# Patient Record
Sex: FEMALE | URBAN - METROPOLITAN AREA
[De-identification: names, ages, dates, MRNs, and addresses within clinical notes are randomized per-mention and may not be internally consistent; named-entity substitution may affect disease eponyms.]

---

## 2022-02-04 ENCOUNTER — OUTPATIENT (OUTPATIENT)
Dept: OUTPATIENT SERVICES | Facility: HOSPITAL | Age: 52
LOS: 1 days | End: 2022-02-04

## 2022-02-04 ENCOUNTER — TRANSCRIPTION ENCOUNTER (OUTPATIENT)
Age: 52
End: 2022-02-04

## 2022-02-04 ENCOUNTER — APPOINTMENT (OUTPATIENT)
Dept: ULTRASOUND IMAGING | Facility: CLINIC | Age: 52
End: 2022-02-04
Payer: COMMERCIAL

## 2022-02-04 PROCEDURE — 76700 US EXAM ABDOM COMPLETE: CPT | Mod: 26

## 2022-05-17 ENCOUNTER — NON-APPOINTMENT (OUTPATIENT)
Age: 52
End: 2022-05-17

## 2022-05-17 PROBLEM — Z00.00 ENCOUNTER FOR PREVENTIVE HEALTH EXAMINATION: Status: ACTIVE | Noted: 2022-05-17

## 2022-05-19 ENCOUNTER — NON-APPOINTMENT (OUTPATIENT)
Age: 52
End: 2022-05-19

## 2022-05-19 ENCOUNTER — APPOINTMENT (OUTPATIENT)
Dept: RHEUMATOLOGY | Facility: CLINIC | Age: 52
End: 2022-05-19
Payer: COMMERCIAL

## 2022-05-19 ENCOUNTER — LABORATORY RESULT (OUTPATIENT)
Age: 52
End: 2022-05-19

## 2022-05-19 VITALS
DIASTOLIC BLOOD PRESSURE: 85 MMHG | WEIGHT: 133.5 LBS | TEMPERATURE: 98.8 F | SYSTOLIC BLOOD PRESSURE: 128 MMHG | OXYGEN SATURATION: 97 % | HEART RATE: 79 BPM | HEIGHT: 62 IN | BODY MASS INDEX: 24.56 KG/M2

## 2022-05-19 DIAGNOSIS — Z86.018 PERSONAL HISTORY OF OTHER BENIGN NEOPLASM: ICD-10-CM

## 2022-05-19 DIAGNOSIS — Z86.39 PERSONAL HISTORY OF OTHER ENDOCRINE, NUTRITIONAL AND METABOLIC DISEASE: ICD-10-CM

## 2022-05-19 DIAGNOSIS — M12.30 PALINDROMIC RHEUMATISM, UNSPECIFIED SITE: ICD-10-CM

## 2022-05-19 DIAGNOSIS — M25.50 PAIN IN UNSPECIFIED JOINT: ICD-10-CM

## 2022-05-19 DIAGNOSIS — Z78.9 OTHER SPECIFIED HEALTH STATUS: ICD-10-CM

## 2022-05-19 PROCEDURE — 36415 COLL VENOUS BLD VENIPUNCTURE: CPT

## 2022-05-19 PROCEDURE — 99204 OFFICE O/P NEW MOD 45 MIN: CPT | Mod: 25

## 2022-05-19 RX ORDER — LEVOTHYROXINE SODIUM 112 UG/1
112 TABLET ORAL
Refills: 0 | Status: ACTIVE | COMMUNITY

## 2022-05-19 NOTE — HISTORY OF PRESENT ILLNESS
[FreeTextEntry1] : This is a 51-year-old woman she presents for an initial rheumatology consultation she reports that starting about 3 weeks ago she began having episodic severe pain possible swelling of various joints initial episode started 3 weeks ago she is physically active she runs she does [employer] but following a run for about an hour she developed severe pain in the big toe possibly a little swelling it lasted about 3 days and resolved completely she did not take anything then about 2 weeks ago she was using [employer] and a few days later again no injury she developed severe pain on the outside of her left knee she had difficulty walking she actually used her 's crutches for a few days and eventually resolved subsequent to that she had an episode of severe left shoulder pain she could not raise her arm up this again lasted a few days and improved she however does have some residual discomfort on full range of motion finally she had a brief episode of left hip pain and more recently a brief episode of right hand pain difficulty in making a fist she did take an occasional ibuprofen which does seem to help she had no prior episodes like this no precipitating factors no recent infections no GI issues no GI upset she has had no associated rashes no associated tick bites or tick removal she does have a history of hypothyroidism attributed to Hashimoto's she is clinically euthyroid on 120 12 mcg of thyroid she has had no fevers no weight loss no coughing no shortness of breath she apparently has had mild increase in transaminases with other liver studies normal there also does appear to be a slight increase in CPK she does report a few years ago she again had liver abnormalities and she was felt to possibly have hepatitis but this resolved I am wondering if this is not all due to her exercise and the ALT and the AST being from skeletal muscle based on her exercise routine she has no family history of any systemic rheumatologic issues

## 2022-05-19 NOTE — ASSESSMENT
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 or possibly other autoimmune process she does have Hashimoto's and another autoimmune condition such as rheumatoid arthritis could be present palindromic presentations of rheumatoid arthritis often have very good prognoses I am updating routine bloods as well as inflammatory markers I am checking rheumatoid factor CCP and GORGE I am also checking Lyme serologies although it is unlikely that this represents a form of Lyme arthritis she will be traveling to [facility] for a few weeks I did suggest that it is acceptable for her to take an occasional ibuprofen should she need it I also suggested range of motion for that residual discomfort in her left shoulder as shoulders do like to freeze up and its best to try to avoid this she was concerned about the mild abnormal transaminases that are seen I suspect these may represent skeletal muscle based on her exercise routines and her slight increase in CPK I will also be repeating all of these I did discuss with her that certainly if her serologies are positive and these episodes continue there may be strong consideration of starting hydroxychloroquine 2 prevent future episodes I am doing a G6PD screen although as a woman it is unlikely that she will be deficient will await these results I did suggest that we follow-up on all her blood results when they return I will be sending copy of this consult to her primary care physician will have access to bloods on the [facility] bloods will be drawn in the office today sent to core lab for processing

## 2022-05-19 NOTE — PHYSICAL EXAM
[General Appearance - Alert] : alert [General Appearance - In No Acute Distress] : in no acute distress [General Appearance - Well Nourished] : well nourished [General Appearance - Well Developed] : well developed [Edema] : there was no peripheral edema [Abnormal Walk] : normal gait [Nail Clubbing] : no clubbing  or cyanosis of the fingernails [Musculoskeletal - Swelling] : no joint swelling seen [Motor Tone] : muscle strength and tone were normal [Skin Color & Pigmentation] : normal skin color and pigmentation [Skin Turgor] : normal skin turgor [] : no rash [No Focal Deficits] : no focal deficits [FreeTextEntry1] : No synovitis no nodules mild impingement of left shoulder

## 2022-05-19 NOTE — DATA REVIEWED
[FreeTextEntry1] : Labs from January February and March in [facility] HI reviewed she did have a slightly low white count back in January of in the 3000 range ALT and AST was slightly elevated although they subsequently normalized there was a single CPK which was again slightly above normal range back in 2020 I also see a slight increase in AST

## 2022-05-19 NOTE — REVIEW OF SYSTEMS
[As Noted in HPI] : as noted in HPI [Feeling Poorly] : not feeling poorly [Feeling Tired] : not feeling tired [Dry Eyes] : no dryness of the eyes [Skin Lesions] : no skin lesions [Itching] : no itching [FreeTextEntry4] : No dry mouth

## 2022-05-20 ENCOUNTER — NON-APPOINTMENT (OUTPATIENT)
Age: 52
End: 2022-05-20

## 2022-05-20 LAB
ALBUMIN SERPL ELPH-MCNC: 4.6 G/DL
ALP BLD-CCNC: 117 U/L
ALT SERPL-CCNC: 63 U/L
ANION GAP SERPL CALC-SCNC: 12 MMOL/L
AST SERPL-CCNC: 41 U/L
BASOPHILS # BLD AUTO: 0.05 K/UL
BASOPHILS NFR BLD AUTO: 1.6 %
BILIRUB SERPL-MCNC: 0.4 MG/DL
BUN SERPL-MCNC: 11 MG/DL
CALCIUM SERPL-MCNC: 9.7 MG/DL
CHLORIDE SERPL-SCNC: 104 MMOL/L
CK SERPL-CCNC: 67 U/L
CO2 SERPL-SCNC: 25 MMOL/L
CREAT SERPL-MCNC: 0.76 MG/DL
CRP SERPL-MCNC: 4 MG/L
EGFR: 96 ML/MIN/1.73M2
EOSINOPHIL # BLD AUTO: 0.05 K/UL
EOSINOPHIL NFR BLD AUTO: 1.6 %
ERYTHROCYTE [SEDIMENTATION RATE] IN BLOOD BY WESTERGREN METHOD: 29 MM/HR
GLUCOSE SERPL-MCNC: 98 MG/DL
HCT VFR BLD CALC: 42.9 %
HGB BLD-MCNC: 13.9 G/DL
LYMPHOCYTES # BLD AUTO: 1.46 K/UL
LYMPHOCYTES NFR BLD AUTO: 50.8 %
MAN DIFF?: NORMAL
MCHC RBC-ENTMCNC: 31 PG
MCHC RBC-ENTMCNC: 32.4 GM/DL
MCV RBC AUTO: 95.8 FL
MONOCYTES # BLD AUTO: 0.32 K/UL
MONOCYTES NFR BLD AUTO: 11.3 %
NEUTROPHILS # BLD AUTO: 0.95 K/UL
NEUTROPHILS NFR BLD AUTO: 33.1 %
PLATELET # BLD AUTO: 210 K/UL
POTASSIUM SERPL-SCNC: 5.4 MMOL/L
PROT SERPL-MCNC: 7.2 G/DL
RBC # BLD: 4.48 M/UL
RBC # FLD: 12.5 %
RHEUMATOID FACT SER QL: 73 IU/ML
SODIUM SERPL-SCNC: 141 MMOL/L
WBC # FLD AUTO: 2.87 K/UL

## 2022-05-22 ENCOUNTER — NON-APPOINTMENT (OUTPATIENT)
Age: 52
End: 2022-05-22

## 2022-05-22 LAB
CCP AB SER IA-ACNC: >250 UNITS
DSDNA AB SER-ACNC: <12 IU/ML
ENA SS-A AB SER IA-ACNC: <0.2 AL
ENA SS-B AB SER IA-ACNC: <0.2 AL
RF+CCP IGG SER-IMP: ABNORMAL

## 2022-05-23 LAB — ANA SER IF-ACNC: NEGATIVE

## 2022-05-24 ENCOUNTER — NON-APPOINTMENT (OUTPATIENT)
Age: 52
End: 2022-05-24

## 2022-05-24 LAB — G6PD SER-CCNC: 16.8 U/G HGB

## 2022-06-06 ENCOUNTER — RX RENEWAL (OUTPATIENT)
Age: 52
End: 2022-06-06

## 2022-06-21 ENCOUNTER — NON-APPOINTMENT (OUTPATIENT)
Age: 52
End: 2022-06-21

## 2022-07-11 ENCOUNTER — APPOINTMENT (OUTPATIENT)
Dept: RHEUMATOLOGY | Facility: CLINIC | Age: 52
End: 2022-07-11

## 2022-07-11 ENCOUNTER — NON-APPOINTMENT (OUTPATIENT)
Age: 52
End: 2022-07-11

## 2022-07-11 VITALS
HEIGHT: 62 IN | OXYGEN SATURATION: 99 % | TEMPERATURE: 98.4 F | SYSTOLIC BLOOD PRESSURE: 109 MMHG | HEART RATE: 79 BPM | BODY MASS INDEX: 24.29 KG/M2 | DIASTOLIC BLOOD PRESSURE: 69 MMHG | WEIGHT: 132 LBS

## 2022-07-11 PROCEDURE — 36415 COLL VENOUS BLD VENIPUNCTURE: CPT

## 2022-07-11 PROCEDURE — 99214 OFFICE O/P EST MOD 30 MIN: CPT | Mod: 25

## 2022-07-11 RX ORDER — PREDNISONE 5 MG/1
5 TABLET ORAL
Qty: 60 | Refills: 6 | Status: ACTIVE | COMMUNITY
Start: 2022-07-11 | End: 1900-01-01

## 2022-07-11 NOTE — PHYSICAL EXAM
[General Appearance - Alert] : alert [General Appearance - In No Acute Distress] : in no acute distress [General Appearance - Well Nourished] : well nourished [General Appearance - Well Developed] : well developed [Sclera] : the sclera and conjunctiva were normal [] : no rash [FreeTextEntry1] : There is tenderness in the wrist consistent with a Decore veins there is also tenderness of both ankles

## 2022-07-11 NOTE — ASSESSMENT
[FreeTextEntry1] : This is a 52-year-old woman initially presenting as palindromic rheumatism but subsequently more persistent daily pain she has been on hydroxychloroquine 400 mg a day for about 4 weeks she has had some good days but continues to have days of very severe pain significant stiffness in the morning she did have abnormal ALT and AST with normal CPK however these appear to have been normalized she was seen by hepatology she had an MRI of her liver which was normal hepatitis serologies were negative I did discuss with her 8 trial of low-dose prednisone as bridge therapy I have recommended 10 mg a day with the hope that this will significantly rapidly improve her symptoms I would not be stopping the hydroxychloroquine I did explain to her I would want her to likely add a steroid sparing agent such as methotrexate however I am concerned about its liver toxicity and would await recommendations from hepatology regarding the safety of using it she also has a low white count which may also be a relative contraindication or caution with methotrexate I also discussed a biologic there does not appear to be any contraindication but I am checking QuantiFERON-TB prior to consideration of its use I plan from hearing back from her in a few days hopefully there will be a dramatic response to low-dose prednisone while we are awaiting a decision regarding the addition of methotrexate versus applying for insurance approval for a biologic such as Humira or Enbrel-bloods will be drawn in the office today and sent to core lab for review

## 2022-07-11 NOTE — DATA REVIEWED
[FreeTextEntry1] : Laboratory studies done at [facility] lab June 28 ALT and AST are now normal only gamma GTP is slightly high white count again remains low this is of unclear significance but seems to have preceded even starting of hydroxychloroquine and will need to be monitored MRI of the liver was normal hepatitis serologies were all negative in particular hepatitis B and hepatitis C will await review by hepatologist

## 2022-07-11 NOTE — HISTORY OF PRESENT ILLNESS
[FreeTextEntry1] : on  mg / day and ibuprofen - for pain's is a 4-year-old woman she presents for follow-up visit she has been on hydroxychloroquine 400 mg every day for about 30 days she reports that some days she feels better but other days she continues to have severe pain  in her wrists as well as her ankles with some swelling in her ankles significant stiffness in the morning she does ice the joints she does take ibuprofen which she tolerates well but continues to have days of severe pain she did recently see a hepatologist because of abnormal ALT and AST I had an opportunity to review his notes I have also had an opportunity to review his studies including a recent MRI and laboratory studies these were all done at [facility] and available to me of note my original GORGE and DNA have been negative although on repeat the GORGE is now positive but the double-stranded DNA the SSA the SSB are all negative ESR done in May was somewhat-she will be following up with hepatology next week

## 2022-07-12 LAB
ALBUMIN SERPL ELPH-MCNC: 4.5 G/DL
ALP BLD-CCNC: 86 U/L
ALT SERPL-CCNC: 25 U/L
ANION GAP SERPL CALC-SCNC: 13 MMOL/L
AST SERPL-CCNC: 26 U/L
BASOPHILS # BLD AUTO: 0.04 K/UL
BASOPHILS NFR BLD AUTO: 1.3 %
BILIRUB SERPL-MCNC: 0.3 MG/DL
BUN SERPL-MCNC: 16 MG/DL
CALCIUM SERPL-MCNC: 9 MG/DL
CHLORIDE SERPL-SCNC: 104 MMOL/L
CO2 SERPL-SCNC: 24 MMOL/L
CREAT SERPL-MCNC: 0.9 MG/DL
CRP SERPL-MCNC: 6 MG/L
EGFR: 78 ML/MIN/1.73M2
EOSINOPHIL # BLD AUTO: 0.06 K/UL
EOSINOPHIL NFR BLD AUTO: 1.9 %
ERYTHROCYTE [SEDIMENTATION RATE] IN BLOOD BY WESTERGREN METHOD: 40 MM/HR
GLUCOSE SERPL-MCNC: 100 MG/DL
HCT VFR BLD CALC: 38.1 %
HGB BLD-MCNC: 12.6 G/DL
IMM GRANULOCYTES NFR BLD AUTO: 0 %
LYMPHOCYTES # BLD AUTO: 1.05 K/UL
LYMPHOCYTES NFR BLD AUTO: 33.2 %
MAN DIFF?: NORMAL
MCHC RBC-ENTMCNC: 30.6 PG
MCHC RBC-ENTMCNC: 33.1 GM/DL
MCV RBC AUTO: 92.5 FL
MONOCYTES # BLD AUTO: 0.42 K/UL
MONOCYTES NFR BLD AUTO: 13.3 %
NEUTROPHILS # BLD AUTO: 1.59 K/UL
NEUTROPHILS NFR BLD AUTO: 50.3 %
PLATELET # BLD AUTO: 223 K/UL
POTASSIUM SERPL-SCNC: 4.6 MMOL/L
PROT SERPL-MCNC: 6.9 G/DL
RBC # BLD: 4.12 M/UL
RBC # FLD: 12.6 %
SODIUM SERPL-SCNC: 140 MMOL/L
WBC # FLD AUTO: 3.16 K/UL

## 2022-07-14 LAB
M TB IFN-G BLD-IMP: NEGATIVE
QUANTIFERON TB PLUS MITOGEN MINUS NIL: 7.31 IU/ML
QUANTIFERON TB PLUS NIL: 0.02 IU/ML
QUANTIFERON TB PLUS TB1 MINUS NIL: 0.02 IU/ML
QUANTIFERON TB PLUS TB2 MINUS NIL: 0.03 IU/ML

## 2022-07-20 ENCOUNTER — NON-APPOINTMENT (OUTPATIENT)
Age: 52
End: 2022-07-20

## 2022-07-20 ENCOUNTER — RX RENEWAL (OUTPATIENT)
Age: 52
End: 2022-07-20

## 2022-07-20 RX ORDER — FOLIC ACID 1 MG/1
1 TABLET ORAL DAILY
Qty: 90 | Refills: 0 | Status: ACTIVE | COMMUNITY
Start: 2022-07-20 | End: 1900-01-01

## 2022-07-26 ENCOUNTER — TRANSCRIPTION ENCOUNTER (OUTPATIENT)
Age: 52
End: 2022-07-26

## 2022-08-10 ENCOUNTER — TRANSCRIPTION ENCOUNTER (OUTPATIENT)
Age: 52
End: 2022-08-10

## 2022-08-10 ENCOUNTER — RX RENEWAL (OUTPATIENT)
Age: 52
End: 2022-08-10

## 2022-08-29 ENCOUNTER — APPOINTMENT (OUTPATIENT)
Dept: RHEUMATOLOGY | Facility: CLINIC | Age: 52
End: 2022-08-29

## 2022-08-29 VITALS
OXYGEN SATURATION: 98 % | DIASTOLIC BLOOD PRESSURE: 83 MMHG | BODY MASS INDEX: 24.11 KG/M2 | TEMPERATURE: 98.5 F | HEART RATE: 86 BPM | WEIGHT: 131 LBS | HEIGHT: 62 IN | SYSTOLIC BLOOD PRESSURE: 127 MMHG

## 2022-08-29 PROCEDURE — 99214 OFFICE O/P EST MOD 30 MIN: CPT | Mod: 25

## 2022-08-29 PROCEDURE — 36415 COLL VENOUS BLD VENIPUNCTURE: CPT

## 2022-08-29 NOTE — HISTORY OF PRESENT ILLNESS
[FreeTextEntry1] : This is a 52-year-old woman she comes with her  she comes to discuss current medications as well as future medication options she has a history of seropositive CCP positive rheumatoid arthritis she has had evidence of elevations of inflammatory markers she is currently taking hydroxychloroquine 400 mg a day she is also been on methotrexate she is now up to 20 mg/week she has been on this for approximately 6 weeks she takes daily folic acid she is currently off prednisone always with that she has found that prednisone was quite effective she does take ibuprofen she is looking for guidelines on how much she can take ibuprofen she tolerates it well and there is a theoretical possible interaction with the methotrexate that she is on she does report that she is improved and that she has had no severe episodes of severe flares like she had previously had although continues to have pain as well as stiffness per tickly after sitting for long periods of time she has questions about various medications including the addition of Biologics the use of prednisone the use of triple therapy which is hydroxychloroquine methotrexate and sulfasalazine she has questions regarding why sulfasalazine has not been used she is looking for guidance regarding work and work at home as well as any needed letters she appears to be tolerating methotrexate as well as hydroxychloroquine well-she has residual soft tissue swelling of her third MCP joint on her right hand she also has swelling of her right ankle which is currently wrapped

## 2022-08-29 NOTE — PHYSICAL EXAM
[General Appearance - Alert] : alert [General Appearance - Well Nourished] : well nourished [Edema] : there was no peripheral edema [] : no rash [FreeTextEntry1] : Prominence third MCP joint on the right as well as soft tissue swelling of the neck

## 2022-08-29 NOTE — DATA REVIEWED
[FreeTextEntry1] : In addition to this she does have evidence of negative hepatitis B surface antigen core antibody as well as hepatitis C

## 2022-08-29 NOTE — REVIEW OF SYSTEMS
[Feeling Poorly] : feeling poorly [Feeling Tired] : feeling tired [As Noted in HPI] : as noted in HPI [Dry Eyes] : no dryness of the eyes [FreeTextEntry7] : No GI issues

## 2022-08-29 NOTE — ASSESSMENT
[FreeTextEntry1] : 52-year-old woman diagnosed with rheumatoid arthritis presenting initially in a palindromic fashion but subsequently settling in she is now on methotrexate for about 6 weeks she is up to 20 mg a week with folic acid she is also taking ibuprofen as needed she is also on hydroxychloroquine she did take prednisone for short periods of time but is currently completely off I spoke at length to both her and her  I explained to her that I would continue methotrexate for a full 3 months before considering the addition of a biologic which I believe would be the next step she should continue at 20 mg every week I am also continuing the hydroxychloroquine I did not feel the addition of sulfasalazine at this time would be appropriate but would prefer a biologic likely a TNF inhibitor such as Humira or Enbrel there does not appear to be contraindications as her TB and hepatitis testing are negative I discussed the role of prednisone risks and benefits as bridge therapy or perhaps used for brief flares we do not have any x-rays although I do not think they are likely to show changes we are getting some baseline x-rays of hands and feet I am updating bloods today including inflammatory markers will be reviewing these labs when they return they will be drawn in the office today and sent to [facility] for processing

## 2022-08-30 ENCOUNTER — OUTPATIENT (OUTPATIENT)
Dept: OUTPATIENT SERVICES | Facility: HOSPITAL | Age: 52
LOS: 1 days | End: 2022-08-30

## 2022-08-30 ENCOUNTER — APPOINTMENT (OUTPATIENT)
Dept: RADIOLOGY | Facility: CLINIC | Age: 52
End: 2022-08-30

## 2022-08-30 ENCOUNTER — RESULT REVIEW (OUTPATIENT)
Age: 52
End: 2022-08-30

## 2022-08-30 ENCOUNTER — NON-APPOINTMENT (OUTPATIENT)
Age: 52
End: 2022-08-30

## 2022-08-30 PROCEDURE — 73630 X-RAY EXAM OF FOOT: CPT | Mod: 26,50

## 2022-08-30 PROCEDURE — 73120 X-RAY EXAM OF HAND: CPT | Mod: 26,50

## 2022-08-31 ENCOUNTER — NON-APPOINTMENT (OUTPATIENT)
Age: 52
End: 2022-08-31

## 2022-08-31 LAB
ALBUMIN SERPL ELPH-MCNC: 4.6 G/DL
ALP BLD-CCNC: 114 U/L
ALT SERPL-CCNC: 54 U/L
ANION GAP SERPL CALC-SCNC: 15 MMOL/L
AST SERPL-CCNC: 37 U/L
BASOPHILS # BLD AUTO: 0.04 K/UL
BASOPHILS NFR BLD AUTO: 1 %
BILIRUB SERPL-MCNC: 0.3 MG/DL
BUN SERPL-MCNC: 12 MG/DL
CALCIUM SERPL-MCNC: 9.5 MG/DL
CHLORIDE SERPL-SCNC: 103 MMOL/L
CO2 SERPL-SCNC: 24 MMOL/L
CREAT SERPL-MCNC: 0.79 MG/DL
CRP SERPL-MCNC: <3 MG/L
EGFR: 91 ML/MIN/1.73M2
EOSINOPHIL # BLD AUTO: 0.12 K/UL
EOSINOPHIL NFR BLD AUTO: 2.9 %
ERYTHROCYTE [SEDIMENTATION RATE] IN BLOOD BY WESTERGREN METHOD: 24 MM/HR
GLUCOSE SERPL-MCNC: 93 MG/DL
HCT VFR BLD CALC: 43.8 %
HGB BLD-MCNC: 13.5 G/DL
IMM GRANULOCYTES NFR BLD AUTO: 0.2 %
LYMPHOCYTES # BLD AUTO: 1.7 K/UL
LYMPHOCYTES NFR BLD AUTO: 40.5 %
MAN DIFF?: NORMAL
MCHC RBC-ENTMCNC: 30.8 GM/DL
MCHC RBC-ENTMCNC: 30.8 PG
MCV RBC AUTO: 100 FL
MONOCYTES # BLD AUTO: 0.45 K/UL
MONOCYTES NFR BLD AUTO: 10.7 %
NEUTROPHILS # BLD AUTO: 1.88 K/UL
NEUTROPHILS NFR BLD AUTO: 44.7 %
PLATELET # BLD AUTO: 218 K/UL
POTASSIUM SERPL-SCNC: 5 MMOL/L
PROT SERPL-MCNC: 7 G/DL
RBC # BLD: 4.38 M/UL
RBC # FLD: 14.1 %
SODIUM SERPL-SCNC: 143 MMOL/L
WBC # FLD AUTO: 4.2 K/UL

## 2022-10-02 ENCOUNTER — NON-APPOINTMENT (OUTPATIENT)
Age: 52
End: 2022-10-02

## 2022-10-03 ENCOUNTER — TRANSCRIPTION ENCOUNTER (OUTPATIENT)
Age: 52
End: 2022-10-03

## 2022-10-04 ENCOUNTER — RX RENEWAL (OUTPATIENT)
Age: 52
End: 2022-10-04

## 2022-10-06 ENCOUNTER — RX RENEWAL (OUTPATIENT)
Age: 52
End: 2022-10-06

## 2022-11-09 ENCOUNTER — APPOINTMENT (OUTPATIENT)
Dept: RHEUMATOLOGY | Facility: CLINIC | Age: 52
End: 2022-11-09

## 2022-11-09 VITALS
BODY MASS INDEX: 25.47 KG/M2 | OXYGEN SATURATION: 99 % | TEMPERATURE: 97.3 F | WEIGHT: 138.38 LBS | HEIGHT: 62 IN | SYSTOLIC BLOOD PRESSURE: 129 MMHG | HEART RATE: 74 BPM | DIASTOLIC BLOOD PRESSURE: 86 MMHG

## 2022-11-09 DIAGNOSIS — M06.9 RHEUMATOID ARTHRITIS, UNSPECIFIED: ICD-10-CM

## 2022-11-09 PROCEDURE — 99213 OFFICE O/P EST LOW 20 MIN: CPT | Mod: 25

## 2022-11-09 PROCEDURE — 36415 COLL VENOUS BLD VENIPUNCTURE: CPT

## 2022-11-10 LAB
ALBUMIN SERPL ELPH-MCNC: 4.6 G/DL
ALP BLD-CCNC: 86 U/L
ALT SERPL-CCNC: 55 U/L
ANION GAP SERPL CALC-SCNC: 10 MMOL/L
AST SERPL-CCNC: 37 U/L
BASOPHILS # BLD AUTO: 0.06 K/UL
BASOPHILS NFR BLD AUTO: 1.3 %
BILIRUB SERPL-MCNC: 0.3 MG/DL
BUN SERPL-MCNC: 12 MG/DL
CALCIUM SERPL-MCNC: 9.4 MG/DL
CHLORIDE SERPL-SCNC: 100 MMOL/L
CO2 SERPL-SCNC: 27 MMOL/L
CREAT SERPL-MCNC: 0.85 MG/DL
CRP SERPL-MCNC: <3 MG/L
EGFR: 83 ML/MIN/1.73M2
EOSINOPHIL # BLD AUTO: 0.07 K/UL
EOSINOPHIL NFR BLD AUTO: 1.5 %
ERYTHROCYTE [SEDIMENTATION RATE] IN BLOOD BY WESTERGREN METHOD: 9 MM/HR
GLUCOSE SERPL-MCNC: 88 MG/DL
HCT VFR BLD CALC: 39.8 %
HGB BLD-MCNC: 13.4 G/DL
IMM GRANULOCYTES NFR BLD AUTO: 0.2 %
LYMPHOCYTES # BLD AUTO: 1.38 K/UL
LYMPHOCYTES NFR BLD AUTO: 30.3 %
MAN DIFF?: NORMAL
MCHC RBC-ENTMCNC: 32.8 PG
MCHC RBC-ENTMCNC: 33.7 GM/DL
MCV RBC AUTO: 97.5 FL
MONOCYTES # BLD AUTO: 0.52 K/UL
MONOCYTES NFR BLD AUTO: 11.4 %
NEUTROPHILS # BLD AUTO: 2.52 K/UL
NEUTROPHILS NFR BLD AUTO: 55.3 %
PLATELET # BLD AUTO: 235 K/UL
POTASSIUM SERPL-SCNC: 4.5 MMOL/L
PROT SERPL-MCNC: 7.1 G/DL
RBC # BLD: 4.08 M/UL
RBC # FLD: 14.5 %
SODIUM SERPL-SCNC: 137 MMOL/L
WBC # FLD AUTO: 4.56 K/UL

## 2022-11-10 NOTE — PHYSICAL EXAM
[General Appearance - Alert] : alert [General Appearance - Well Nourished] : well nourished [General Appearance - Well Developed] : well developed [Abnormal Walk] : normal gait

## 2022-11-10 NOTE — HISTORY OF PRESENT ILLNESS
[FreeTextEntry1] : This is a 52-year-old woman she has seropositive CCP positive rheumatoid arthritis it initially presented as an episodic or palindromic but subsequently settled then she is now taking methotrexate 25 mg weekly which is maximum dose she also remains on hydroxychloroquine although she is uncertain whether this is providing any benefit while she does report overall improvement she still having episodes of flares with pain and stiffness particularly after sitting in a car for long periods of time with knee pain discomfort not necessarily severe stiffness or pain in the morning but pain after sitting for long periods of time-she is anxious to return to full activity but is not able to exercise because she is limited by pain

## 2022-11-10 NOTE — ASSESSMENT
[FreeTextEntry1] : This is a 52-year-old woman with a history of seropositive rheumatoid arthritis she is now on maximum dose of methotrexate 25 mg every week with continued episodes of of flaring particularly after sitting or inactivity for long periods of time and ability to go to exercise she is also on hydroxychloroquine we have discussed again the addition of a TNF inhibitor or biologic agent I would suggest either Humira or Enbrel risks and benefits of these medications have been discussed they often work in combination with methotrexate in particular in individuals who have been on maximum doses of methotrexate and continues to have symptoms or flares for now I would also continue with hydroxychloroquine she is hepatitis B- she is QuantiFERON-TB gold negative no contraindication to the adding of a biologic I will be updating bloods today in particular CBC and LFTs because of her methotrexate use and did discuss reduction of dose perhaps down to 20 as up to 25 has been ineffective after review of labs she will be getting back to me regarding the addition of a biologic agent

## 2022-11-10 NOTE — DATA REVIEWED
[FreeTextEntry1] : Previous labs and values have all been reviewed in particular hepatitis B as well as quant to [facility] have all been negative or normal

## 2022-11-28 RX ORDER — HYDROXYCHLOROQUINE SULFATE 200 MG/1
200 TABLET, FILM COATED ORAL
Qty: 180 | Refills: 0 | Status: ACTIVE | COMMUNITY
Start: 2022-06-06 | End: 1900-01-01

## 2022-11-28 RX ORDER — METHOTREXATE 2.5 MG/1
2.5 TABLET ORAL
Qty: 128 | Refills: 0 | Status: ACTIVE | COMMUNITY
Start: 2022-07-20 | End: 1900-01-01

## 2023-01-13 ENCOUNTER — NON-APPOINTMENT (OUTPATIENT)
Age: 53
End: 2023-01-13

## 2023-03-03 ENCOUNTER — APPOINTMENT (OUTPATIENT)
Dept: RHEUMATOLOGY | Facility: CLINIC | Age: 53
End: 2023-03-03

## 2023-04-17 ENCOUNTER — APPOINTMENT (OUTPATIENT)
Dept: COLORECTAL SURGERY | Facility: CLINIC | Age: 53
End: 2023-04-17
Payer: COMMERCIAL

## 2023-04-17 VITALS
HEIGHT: 62 IN | WEIGHT: 137 LBS | DIASTOLIC BLOOD PRESSURE: 86 MMHG | OXYGEN SATURATION: 98 % | HEART RATE: 77 BPM | SYSTOLIC BLOOD PRESSURE: 129 MMHG | BODY MASS INDEX: 25.21 KG/M2

## 2023-04-17 PROCEDURE — 46600 DIAGNOSTIC ANOSCOPY SPX: CPT

## 2023-04-17 PROCEDURE — 99203 OFFICE O/P NEW LOW 30 MIN: CPT | Mod: 25

## 2023-04-17 RX ORDER — HYDROCORTISONE 25 MG/G
2.5 CREAM TOPICAL
Qty: 1 | Refills: 3 | Status: ACTIVE | COMMUNITY
Start: 2023-04-17 | End: 1900-01-01

## 2023-04-17 NOTE — ASSESSMENT
[FreeTextEntry1] : Exam findings and diagnosis were discussed at length with patient. \par Recommendations including increased fiber intake, adequate daily hydration, stool softeners as needed, and sitz baths as needed and after bowel movements were discussed.\par Avoid constipation and diarrhea, avoid pushing/straining.\par Fiber goal 25-30g/day, recommend psyllium supplement. Adequate oral hydration - goal 64oz water/day\par \par Medical management, such as hydrocortisone cream/suppositories and witch hazel, was discussed as needed for symptoms. \par Office based treatment, such as rubber band ligation, was discussed as an alternative if symptoms persist despite conservative management.\par Role of hemorrhoidectomy also discussed.\par Office procedure and surgical risks and benefits discussed.\par \par Patient would like to start with supportive measures and hydrocortisone cream.\par She will f/u if symptoms persist, worsen, or if she would like to pursue further intervention. \par \par All questions answered, patient expressed understanding and is agreeable to this plan.\par

## 2023-04-17 NOTE — PHYSICAL EXAM
[FreeTextEntry1] : Medical assistant present for duration of physical examination\par \par General no acute distress, alert and oriented\par Psych calm, pleasant demeanor, responding appropriately to questions\par Nonlabored breathing\par Ambulating without assistance\par Skin normal color and pigment, no visible lesions or rashes\par \par Anorectal Exam:\par Inspection no erythema, induration or fluctuance, no skin excoriation, no fissure, soft mild to moderate external hemorrhoids without inflammation or thrombosis\par KRISTY nontender, no masses palpated, no blood on gloved finger\par \par Procedure: Anoscopy\par \par Pre procedure Diagnosis: hemorrhoids\par Post procedure Diagnosis: hemorrhoids\par Anesthesia: none\par Estimated blood loss: none\par Specimen: none\par Complications: none\par \par Consent obtained. Anoscopy was performed by passing a lighted anoscope with lubricant jelly into the anal canal and the entire anal mucosal surface was inspected. Findings included no fissure, mild to moderate internal hemorrhoids, no visible masses or lesions in anal canal\par \par Patient tolerated examination and procedure well.\par \par \par

## 2024-05-07 ENCOUNTER — OUTPATIENT (OUTPATIENT)
Dept: OUTPATIENT SERVICES | Facility: HOSPITAL | Age: 54
LOS: 1 days | End: 2024-05-07

## 2024-05-07 ENCOUNTER — APPOINTMENT (OUTPATIENT)
Dept: ULTRASOUND IMAGING | Facility: CLINIC | Age: 54
End: 2024-05-07
Payer: COMMERCIAL

## 2024-05-07 PROCEDURE — 76856 US EXAM PELVIC COMPLETE: CPT | Mod: 26

## 2024-05-07 PROCEDURE — 76830 TRANSVAGINAL US NON-OB: CPT | Mod: 26

## 2024-06-24 ENCOUNTER — APPOINTMENT (OUTPATIENT)
Dept: COLORECTAL SURGERY | Facility: CLINIC | Age: 54
End: 2024-06-24
Payer: COMMERCIAL

## 2024-06-24 VITALS
WEIGHT: 137 LBS | HEART RATE: 86 BPM | RESPIRATION RATE: 16 BRPM | SYSTOLIC BLOOD PRESSURE: 137 MMHG | DIASTOLIC BLOOD PRESSURE: 90 MMHG | BODY MASS INDEX: 25.21 KG/M2 | OXYGEN SATURATION: 96 % | HEIGHT: 62 IN

## 2024-06-24 DIAGNOSIS — K64.9 UNSPECIFIED HEMORRHOIDS: ICD-10-CM

## 2024-06-24 PROCEDURE — 46221 LIGATION OF HEMORRHOID(S): CPT

## 2024-08-01 ENCOUNTER — TRANSCRIPTION ENCOUNTER (OUTPATIENT)
Age: 54
End: 2024-08-01